# Patient Record
Sex: FEMALE | Race: WHITE
[De-identification: names, ages, dates, MRNs, and addresses within clinical notes are randomized per-mention and may not be internally consistent; named-entity substitution may affect disease eponyms.]

---

## 2017-03-28 LAB
ANION GAP SERPL CALC-SCNC: 6 MMOL/L (ref 3–11)
BASOPHILS # BLD: 0.01 K/UL (ref 0–0.2)
BASOPHILS NFR BLD: 0.2 %
BUN SERPL-MCNC: 21 MG/DL (ref 7–18)
BUN/CREAT SERPL: 22.9 (ref 10–20)
CALCIUM SERPL-MCNC: 8.9 MG/DL (ref 8.5–10.1)
CHLORIDE SERPL-SCNC: 108 MMOL/L (ref 98–107)
CO2 SERPL-SCNC: 28 MMOL/L (ref 21–32)
COMPLETE: YES
CREAT CL PREDICTED SERPL C-G-VRATE: 53.7 ML/MIN
CREAT SERPL-MCNC: 0.93 MG/DL (ref 0.6–1.2)
EOSINOPHIL NFR BLD AUTO: 309 K/UL (ref 130–400)
GLUCOSE SERPL-MCNC: 103 MG/DL (ref 70–99)
HCT VFR BLD CALC: 40.1 % (ref 37–47)
IG%: 0.2 %
IMM GRANULOCYTES NFR BLD AUTO: 21.5 %
LYMPHOCYTES # BLD: 1.23 K/UL (ref 1.2–3.4)
MCH RBC QN AUTO: 30.8 PG (ref 25–34)
MCHC RBC AUTO-ENTMCNC: 32.7 G/DL (ref 32–36)
MCV RBC AUTO: 94.4 FL (ref 80–100)
MONOCYTES NFR BLD: 8.2 %
NEUTROPHILS # BLD AUTO: 1 %
NEUTROPHILS NFR BLD AUTO: 68.9 %
PMV BLD AUTO: 9.9 FL (ref 7.4–10.4)
POTASSIUM SERPL-SCNC: 4.3 MMOL/L (ref 3.5–5.1)
RBC # BLD AUTO: 4.25 M/UL (ref 4.2–5.4)
SODIUM SERPL-SCNC: 142 MMOL/L (ref 136–145)
WBC # BLD AUTO: 5.72 K/UL (ref 4.8–10.8)

## 2017-03-28 NOTE — DIAGNOSTIC IMAGING REPORT
CHEST 2 VIEWS ROUTINE



CLINICAL HISTORY: Preoperative chest    



COMPARISON STUDY:  7/9/2015



FINDINGS: The cardiac and mediastinal contours are normal. There is no evidence

of focal pulmonary consolidation. There is no evidence of failure. No pleural

effusions are visualized.[ There is stable right middle lobe atelectasis. There

is a vague 15 mm left upper lung zone opacity. It is unclear whether this

represents a parenchymal nodule or summation.



IMPRESSION: Summation versus 15 mm left apical nodule. No evidence of lobar

consolidation. No evidence of failure.







Electronically signed by:  Tone Donato M.D.

3/28/2017 12:55 PM



Dictated Date/Time:  3/28/2017 12:52 PM

## 2017-03-28 NOTE — PAT MEDICATION INSTRUCTIONS
Service Date


Mar 28, 2017.





Current Home Medication List


Acetaminophen (Tylenol), 1,000 MG PO AMHS PRN for Pain


Albuterol Sulf (Albuterol), 8 MG PO BID


Ascorbic Acid (Vitamin C), 1,500 MG PO QAM


B-Complex Vitamins (Vitamin B Complex), 100 MG PO QAM


Beclomethasone Dip (Qvar), 1-2 PUFFS INH BID


Biotin (Biotin), 3,000 MCG PO BID


Budesonide (Pulmicort Inhaler *), 1-2 PUFF INH BID


Cholecalciferol (Vitamin D-1000), 1,000 UNITS PO HS


Clonazepam (Clonazepam Odt), 0.5 MG PO HS


Cyanocobalamin (Vitamin B12 500MCG), 500 MCG PO QPM


Escitalopram (Lexapro), 15 MG PO QPM


Metformin Hcl (Glucophage *), 500 MG PO QAM


Montelukast Sodium (Singulair), 10 MG PO HS


Multivitamin (Multivitamin), 1 TAB PO QAM


Nabumetone (Relafen), 500 MG PO BID


Pravastatin Sodium (Pravachol), 20 MG PO HS


Pyridoxine Hcl (Vitamin B6), 100 MG PO HS


Ropinirole (Requip), 0.5 MG PO HS


Salmeterol Xinafoate (Serevent Diskus), 1 PUFF INH BID


Vitamin E (Vitamin E 400 Iu), 400 INTER.UNIT PO QPM


[potassium magnesium], 3 TAB PO BID





Medication Instructions


For Your Scheduled Surgery 





- Check with surgeon for instructions: 


Nabumetone (Relafen), 500 MG PO BID





- Hold the following medications starting 3/28/17:


Vitamin E (Vitamin E 400 Iu), 400 INTER.UNIT PO QPM





- Hold the following medications 48 hours prior to surgery:


Metformin Hcl (Glucophage *), 500 MG PO QAM





- Hold the following medications the morning of surgery:


[potassium magnesium], 3 TAB PO BID


Multivitamin (Multivitamin), 1 TAB PO QAM


Biotin (Biotin), 3,000 MCG PO BID


Ascorbic Acid (Vitamin C), 1,500 MG PO QAM


B-Complex Vitamins (Vitamin B Complex), 100 MG PO QAM





- Take the following medications the morning of surgery with a sip of water:


Salmeterol Xinafoate (Serevent Diskus), 1 PUFF INH BID


Budesonide (Pulmicort Inhaler *), 1-2 PUFF INH BID


Beclomethasone Dip (Qvar), 1-2 PUFFS INH BID


Albuterol Sulf (Albuterol), 8 MG PO BID


Acetaminophen (Tylenol), 1,000 MG PO AMHS PRN for Pain





- Hold the following medications as scheduled the night before surgery:


Ropinirole (Requip), 0.5 MG PO HS





- Take the following medications as scheduled the night before surgery:


[potassium magnesium], 3 TAB PO BID


Salmeterol Xinafoate (Serevent Diskus), 1 PUFF INH BID


Pyridoxine Hcl (Vitamin B6), 100 MG PO HS


Pravastatin Sodium (Pravachol), 20 MG PO HS


Montelukast Sodium (Singulair), 10 MG PO HS


Escitalopram (Lexapro), 15 MG PO QPM


Cyanocobalamin (Vitamin B12 500MCG), 500 MCG PO QPM


Clonazepam (Clonazepam Odt), 0.5 MG PO HS


Cholecalciferol (Vitamin D-1000), 1,000 UNITS PO HS


Budesonide (Pulmicort Inhaler *), 1-2 PUFF INH BID


Biotin (Biotin), 3,000 MCG PO BID


Beclomethasone Dip (Qvar), 1-2 PUFFS INH BID


Albuterol Sulf (Albuterol), 8 MG PO BID


Acetaminophen (Tylenol), 1,000 MG PO AMHS PRN for Pain





If you have any questions please call us at [phone] (Jessie Sue PA-C)

 or [phone] or [phone]

## 2017-04-10 ENCOUNTER — HOSPITAL ENCOUNTER (OUTPATIENT)
Dept: HOSPITAL 45 - C.ACU | Age: 77
Discharge: HOME | End: 2017-04-10
Attending: OBSTETRICS & GYNECOLOGY
Payer: COMMERCIAL

## 2017-04-10 VITALS
SYSTOLIC BLOOD PRESSURE: 140 MMHG | DIASTOLIC BLOOD PRESSURE: 65 MMHG | TEMPERATURE: 97.52 F | OXYGEN SATURATION: 93 % | HEART RATE: 79 BPM

## 2017-04-10 VITALS
SYSTOLIC BLOOD PRESSURE: 133 MMHG | TEMPERATURE: 97.52 F | OXYGEN SATURATION: 93 % | HEART RATE: 82 BPM | DIASTOLIC BLOOD PRESSURE: 60 MMHG

## 2017-04-10 VITALS
BODY MASS INDEX: 27 KG/M2 | BODY MASS INDEX: 27 KG/M2 | HEIGHT: 65.98 IN | BODY MASS INDEX: 27 KG/M2 | WEIGHT: 167.99 LBS | WEIGHT: 167.99 LBS | HEIGHT: 65.98 IN

## 2017-04-10 VITALS
HEART RATE: 83 BPM | TEMPERATURE: 97.88 F | DIASTOLIC BLOOD PRESSURE: 64 MMHG | OXYGEN SATURATION: 94 % | SYSTOLIC BLOOD PRESSURE: 133 MMHG

## 2017-04-10 VITALS
SYSTOLIC BLOOD PRESSURE: 141 MMHG | TEMPERATURE: 97.88 F | OXYGEN SATURATION: 93 % | DIASTOLIC BLOOD PRESSURE: 66 MMHG | HEART RATE: 79 BPM

## 2017-04-10 VITALS
SYSTOLIC BLOOD PRESSURE: 132 MMHG | TEMPERATURE: 98.42 F | HEART RATE: 73 BPM | DIASTOLIC BLOOD PRESSURE: 66 MMHG | OXYGEN SATURATION: 96 %

## 2017-04-10 DIAGNOSIS — K21.9: ICD-10-CM

## 2017-04-10 DIAGNOSIS — G40.909: ICD-10-CM

## 2017-04-10 DIAGNOSIS — E55.9: ICD-10-CM

## 2017-04-10 DIAGNOSIS — Z81.1: ICD-10-CM

## 2017-04-10 DIAGNOSIS — E78.5: ICD-10-CM

## 2017-04-10 DIAGNOSIS — F32.9: ICD-10-CM

## 2017-04-10 DIAGNOSIS — J45.909: ICD-10-CM

## 2017-04-10 DIAGNOSIS — N83.299: Primary | ICD-10-CM

## 2017-04-10 DIAGNOSIS — E11.9: ICD-10-CM

## 2017-04-10 PROCEDURE — 58661 LAPAROSCOPY REMOVE ADNEXA: CPT

## 2017-04-10 NOTE — ANESTHESIOLOGY PROGRESS NOTE
Anesthesia Post Op Note


Date & Time


Apr 10, 2017 at 10:49





Vital Signs


Pain Intensity:  0





 Vital Signs Past 12 Hours








  Date Time  Temp Pulse Resp B/P Pulse Ox O2 Delivery O2 Flow Rate FiO2


 


4/10/17 10:25 36.4 82 16 133/60 93 Nasal Cannula 3 


 


4/10/17 09:55 36.4 79 16 140/65 93 Nasal Cannula 3 


 


4/10/17 09:45 36.2 76 18 120/61 95 Nasal Cannula 3 


 


4/10/17 09:35  79 20 133/64 98 Nasal Cannula 3 


 


4/10/17 09:25  83 20 128/66 98 Mask 5 


 


4/10/17 09:15  90 20 135/67 99 Mask 10 


 


4/10/17 09:11 36.0 101 18 143/73 99 Mask 10 


 


4/10/17 06:05 36.9 73 18 132/66 96 Room Air  











Notes


Mental Status:  alert / awake / arousable, participated in evaluation


Pt Amnestic to Procedure:  Yes


Nausea / Vomiting:  adequately controlled


Pain:  adequately controlled


Airway Patency, RR, SpO2:  stable & adequate


BP & HR:  stable & adequate


Hydration State:  stable & adequate


Anesthetic Complications:  no major complications apparent

## 2017-04-10 NOTE — OPERATIVE REPORT
DATE OF OPERATION:  04/10/2017

 

PREOPERATIVE DIAGNOSIS:  Bilateral complex ovarian cyst.

 

POSTOPERATIVE DIAGNOSIS:  Same.

 

PROCEDURE:  Laparoscopic bilateral salpingo-oophorectomy with da Bettina

assist.

 

SURGEON:  Carolyn Diallo MD

 

ANESTHESIA:  General per endotracheal tube.

 

ESTIMATED BLOOD LOSS:  10 mL.

 

FLUIDS:  1100.

 

URINE OUTPUT:  300 mL of bright yellow urine drained from the bladder at the

end of the procedure.

 

INDICATIONS:  Hayley is a 77-year-old PM female, who has

had bilateral complex ovarian cysts detected over a year ago via incidental

CT for diarrhea.  We have been watching these and unfortunately the right one

has continued to grow.  CA-125 has been normal.  Findings at the time of

surgery; bilateral complex ovarian masses noted.  The right measured 6 cm

with a fluid-filled cyst.  The left measured 3 cm.  Tubes were normal

bilaterally.  No other concerning findings in the pelvis.

 

COMPLICATIONS:  None.

 

DRAINS:  None.

 

DISPOSITION:  To recovery room in stable condition.

 

PROCEDURE:  The patient was taken to the operating room where she was

identified verbally and by bracelet.  The patient was placed in the dorsal

lithotomy position in Yellofin stirrups prior to going to sleep to make sure

that the patient's legs, back and knee were comfortable.  This was confirmed

and she was placed under general anesthesia.  Her arms were carefully tucked

and draped at her side.  Her chest was protected.  The  was placed

and she was prepped and draped in normal sterile fashion.  Timeout was held

identifying correct patient, procedure and positioning.  There was no need

for preoperative antibiotics.  Attention was then turned to the perineum

where a Leong catheter was placed.  A speculum was placed in the vagina and

the anterior lip of the cervix was grasped with a single tooth tenaculum.  A

Hummel uterine manipulator was placed into the cervix.  The speculum was

removed and gloves were then changed.

 

Attention was then turned to the abdomen where a supraumbilical incision was

made with a knife.  This was stretched with a snap.  Veress needle was placed

through this opening pressure of 2 mmHg. Abdomen was insufflated with 2.7

liters of carbon dioxide gas.  A 12 mm optical trocar was placed through this

and intra-abdominal placement was confirmed via the laparoscope.  Then two 8

mm trocar sites; one in the right and left lower quadrant were placed.  A 10

mm left upper quadrant trocar was placed.  The da Bettina surgical assist

device was hooked to the patient after she was placed into steep

Trendelenburg.  The  proceeded to the console.

 

First on the right and then on the left the IP was identified and found to be

free from the ureter; it was taken down with cautery and scissors and then

across the tube and the tubo-ovarian ligament until the specimens were

removed.  This was first performed on the right and then on the left. 

Hemostasis was noted to be excellent.

 

The surgeon scrubbed back and the da Bettina surgical assist device was

removed.  A 5 mm scope was placed into the left lower quadrant trocar.  A 10

mm EndoCatch bag was placed through the umbilical trocar site and first the

left ovary was placed and removed.  The right ovary was then placed in a

separate bag.  The cyst was popped with a knife, the fluid was sent for

cytology and then the specimen was able to be removed.  Re-evaluation of the

pelvis showed adequate hemostasis.  All trocars were removed.  The gas was

released from the abdomen.  A deep big figure-of-eight stitch of 0 Vicryl was

placed into the umbilical trocar site and all incisions were then closed with

4-0 Vicryl in a subcuticular fashion.  The incisions were treated with 0.5%

Marcaine and then Dermabond.  All sponge, lap and needle counts were correct

x2.  The patient tolerated the procedure well and was taken to the recovery

room in stable condition.

 

 

I attest to the content of the Intraoperative Record and any orders documented 
therein. Any exceptions are noted below.

 

 

 

SHIRA

## 2017-04-10 NOTE — DISCHARGE INSTRUCTIONS
Discharge Instructions


Date of Service


Apr 10, 2017.





Visit


Reason for Visit:  Complex Ovarian Cyst





Discharge


Discharge Diagnosis / Problem:  s/p removal of both tubes and ovaries 

laproscopically





Discharge Goals


Goal(s):  Specific goals





Activity Recommendations


Activity Limitations:  per Instructions/Follow-up section





Anesthesia


.





Post Anesthesia Instructions:





If you have had General Anesthesia or IV Sedation:





*  Do not drive today.


*  Resume driving when surgeon permits.


*  Do not make important decisions or sign legal documents today.


*  Call surgeon for:





   1.  Temperature elevations greater than 101 degrees F.


   2.  Uncontrollable pain.


   3.  Excessive bleeding.


   4.  Persistent nausea and vomiting.


   5.  Medication intolerance (nausea, vomiting or rash).





*  For nausea and vomiting use only clear liquids such as: tea, soda, bouillon 

until nausea subsides, then gradually increase diet as tolerated.





*  If you have any concerns or questions, call your surgeon's office.  If 

physician is unavailable and it is an emergency, call 911 or go to the nearest 

emergency room.





.





Instructions / Follow-Up


Instructions / Follow-Up





   ACTIVITY RECOMMENDATIONS:





*  Rest the first 2-3 days. You should be back to your normal activity levels 

by day 3.





*  No heavy lifting for 2 weeks.





*  No intercourse, tampons or douching for 1-2 weeks.





*  You may shower the next day.





*  Do not drive anytime that you are taking narcotic pain medicines.








   RETURN TO SCHOOL/WORK:





*  May return to school or work after 2-3 days.








   DIET:





Nausea may occur in the immediate post-operative period.  If so, take clear 

liquids such as tea,


bouillon, apple juice until all nausea has subsided, then resume usual diet.








  MEDICATIONS:





Resume previous medications unless instructed otherwise by your surgeon.





Ibuprofen 200mg 2-3 tablets every 4-6 hours as needed


      -- OR --


Aleve 2 tablets every 8-12 hours as needed for post-operative discomfort





Medications are over the counter. Tylenol may be used if above medications are 

contraindicated


or not preferred. Medication should be taken with food or milk. Do not take on 

an empty stomach.








  SPECIAL CARE INSTRUCTIONS:





*  Check temperature twice daily for one week. report any elevation over 101 

degrees.





*  You may experience some vagina spotting and/or bleeding. This is normal for 1

-2 weeks and


   should not be heavier than a normal period. If it is unusual in amount, call 

your physician.





*  Post-operative discomfort may consist of a sore throat, a "bloated" feeling 

and pain in the 


   shoulders. these are normal symptoms, which usually only last for 2-3 days.





*  Remove band-aids tomorrow and shower. There is no need to replace band-aids 

unless there


   is drainage or discomfort.








 FOLLOW UP VISIT:





Call your doctor's office for a post-operative  2 week visit if not already 

scheduled.





Diet Recommendations


Recommended Home Diet:  no limitations, resume previous diet





Procedures


Procedures Performed:  


Bilateral Laparoscopic Salpingo-Oophorectomy





with use of DaVinci





Pending Studies


Studies pending at discharge:  no





Medical Emergencies








.


Who to Call and When:





Medical Emergencies:  If at any time you feel your situation is an emergency, 

please call 911 immediately.





.





Non-Emergent Contact


Non-Emergency issues call your:  Gynecologist





.


.





"Provider Documentation" section prepared by Carolyn Diallo.





[place] Drug Monitoring Program


Search Results:  no issues identified

## 2017-04-10 NOTE — [facility] POST OPERATIVE BRIEF NOTE
Immediate Operative Summary


Operative Date


Apr 10, 2017.





Pre-Operative Diagnosis





Complex Ovarian Cysts





Post-Operative Diagnosis





Complex Ovarian Cysts





Procedure(s) Performed





Bilateral Laparoscopic Salpingo-Oophorectomy





with use of Aldair





Surgeon


Dr. Carolyn Diallo





Assistant Surgeon(s)


none





Estimated Blood Loss


10mL





Findings


bilateral enlarged ovaries, right with a cystic mass about 5cm, left with 

cystic mass about 3cm.  nl liver edge, nl uterus and tubes, no other concerning 

signs in the pelvis.





Fluids (cc crystalloids)


1100cc





Specimens





right tube, and ovary


left tube and ovary


Cytology:


1.  pelvic washings.





2. Right Ovarian Cyst Fluid





Drains


none





Anesthesia


gett





Complication(s)


None





Disposition


Recovery Room / PACU

## 2017-09-21 ENCOUNTER — HOSPITAL ENCOUNTER (OUTPATIENT)
Dept: HOSPITAL 45 - C.ULTR | Age: 77
Discharge: HOME | End: 2017-09-21
Attending: PHYSICIAN ASSISTANT
Payer: COMMERCIAL

## 2017-09-21 DIAGNOSIS — R10.11: Primary | ICD-10-CM

## 2017-09-21 NOTE — DIAGNOSTIC IMAGING REPORT
ULTRASOUND RIGHT UPPER QUADRANT ABDOMEN



CLINICAL HISTORY: Right upper quadrant and epigastric abdominal pain.



COMPARISON STUDY: Abdominal CT dated 7/4/2015.



TECHNIQUE: Real-time, grayscale, and color flow sonography of the right upper

quadrant of the abdomen was performed. Images are reviewed in the transverse and

longitudinal planes.



FINDINGS:



Liver: The liver is normal in size and echotexture. There is no intrahepatic

biliary ductal dilatation. The main portal vein is patent.



Gallbladder: The gallbladder is normal in appearance. No gallstones are

identified. There is no gallbladder wall thickening or pericholecystic fluid. A

sonographic Finley's sign is reportedly absent. The common bile duct measures up

to 0.4 cm in diameter.



Pancreas: Visualized portions of the pancreatic head and body are normal in

appearance. The splenic vein is patent.



Right kidney: Survey images of the right kidney demonstrate normal size and

echotexture. There is no hydronephrosis. A 1.8 cm cyst is incidentally noted.



Ascites: None.





IMPRESSION: Unremarkable sonographic assessment of the right upper quadrant. No

gallstones are identified.







Electronically signed by:  Michael Benavidez M.D.

9/21/2017 8:30 AM



Dictated Date/Time:  9/21/2017 8:29 AM

## 2017-11-20 ENCOUNTER — HOSPITAL ENCOUNTER (OUTPATIENT)
Dept: HOSPITAL 45 - C.MAMM | Age: 77
Discharge: HOME | End: 2017-11-20
Attending: OBSTETRICS & GYNECOLOGY
Payer: COMMERCIAL

## 2017-11-20 DIAGNOSIS — R92.0: Primary | ICD-10-CM

## 2017-11-20 NOTE — MAMMOGRAPHY REPORT
BILATERAL DIGITAL DIAGNOSTIC MAMMOGRAM TOMOSYNTHESIS WITH CAD: 11/20/2017

CLINICAL HISTORY: 77-year-old woman presents for close follow-up of coarse heterogeneous microcalcifi
cations in the left lateral posterior breast and also due for bilateral screening mammography.  





TECHNIQUE: Bilateral CC and MLO 2-D and tomosynthesis images, spot magnification left CC and ML views
 were obtained.  Current study was also evaluated with a Computer Aided Detection (CAD) system.  



COMPARISON: Comparison is made to exams dated:  11/14/2016 mammogram, 5/11/2016 mammogram, 2/10/2016 
mammogram, 11/9/2015 mammogram, 10/28/2015 mammogram - [facility], and 3/11/2009.  
 



BREAST COMPOSITION:  The tissue of both breasts is heterogeneously dense, which may obscure small mas
ses.  



FINDINGS:  There is an 8.2 mm cluster of coarse heterogeneous calcifications in the approximate 3:00 
to 4:00 posterior left breast, that is unchanged based on spot magnification views dating back to at 
least 11/09/2015.  With 2 years of mammographic stability this suggests benignity and this most likel
y represents a degenerating fibroadenoma.  Given that this was not seen on more remote mammograms inc
luding the 2008 and 2009 mammograms remains indeterminate.  Another 12 month follow-up left diagnosti
c mammogram including spot magnification views is recommended to ensure longer stability.



No other new suspicious mass, area of distortion, developing asymmetry or other cluster of calcificat
ions is seen bilaterally.



IMPRESSION:  ACR-BI-RADS CATEGORY 3: PROBABLY BENIGN

Stable bilateral mammograms including an 8.2 mm cluster of coarse heterogeneous microcalcifications i
n the lateral posterior left breast.  Another 12 month follow-up left diagnostic mammogram including 
spot magnification views is recommended to ensure longer stability of the calcifications.  Annual maricruz
ateral mammography will also be due at that time.



These results and recommendations were discussed with the patient at the time of the exam.





Approximately 10% of breast cancers are not detected with mammography. A negative mammographic report
 should not delay biopsy if a clinically suggestive mass is present.



Areli Steinberg M.D.          

ay/:11/20/2017 13:40:20  



Imaging Technologist: Treasure GALVAN(R)(M), [facility]

letter sent: Follow Up Recommended 3  

BI-RADS Code: ACR-BI-RADS Category 3: Probably Benign

## 2018-01-02 ENCOUNTER — HOSPITAL ENCOUNTER (OUTPATIENT)
Dept: HOSPITAL 45 - C.LAB1850 | Age: 78
Discharge: HOME | End: 2018-01-02
Attending: INTERNAL MEDICINE
Payer: COMMERCIAL

## 2018-01-02 DIAGNOSIS — R53.81: ICD-10-CM

## 2018-01-02 DIAGNOSIS — J45.909: Primary | ICD-10-CM

## 2018-01-02 DIAGNOSIS — R10.9: ICD-10-CM

## 2018-01-02 LAB
ALBUMIN SERPL-MCNC: 3.8 GM/DL (ref 3.4–5)
ALP SERPL-CCNC: 89 U/L (ref 45–117)
ALT SERPL-CCNC: 30 U/L (ref 12–78)
AST SERPL-CCNC: 21 U/L (ref 15–37)
BASOPHILS # BLD: 0.02 K/UL (ref 0–0.2)
BASOPHILS NFR BLD: 0.3 %
BUN SERPL-MCNC: 33 MG/DL (ref 7–18)
CALCIUM SERPL-MCNC: 9.3 MG/DL (ref 8.5–10.1)
CO2 SERPL-SCNC: 25 MMOL/L (ref 21–32)
CREAT SERPL-MCNC: 0.92 MG/DL (ref 0.6–1.2)
EOS ABS #: 0.07 K/UL (ref 0–0.5)
EOSINOPHIL NFR BLD AUTO: 335 K/UL (ref 130–400)
GLUCOSE SERPL-MCNC: 109 MG/DL (ref 70–99)
HCT VFR BLD CALC: 46.1 % (ref 37–47)
HGB BLD-MCNC: 15 G/DL (ref 12–16)
IG#: 0.02 K/UL (ref 0–0.02)
IMM GRANULOCYTES NFR BLD AUTO: 16.9 %
LYMPHOCYTES # BLD: 1.12 K/UL (ref 1.2–3.4)
MCH RBC QN AUTO: 31.1 PG (ref 25–34)
MCHC RBC AUTO-ENTMCNC: 32.5 G/DL (ref 32–36)
MCV RBC AUTO: 95.6 FL (ref 80–100)
MONO ABS #: 0.65 K/UL (ref 0.11–0.59)
MONOCYTES NFR BLD: 9.8 %
NEUT ABS #: 4.76 K/UL (ref 1.4–6.5)
NEUTROPHILS # BLD AUTO: 1.1 %
NEUTROPHILS NFR BLD AUTO: 71.6 %
PMV BLD AUTO: 10.7 FL (ref 7.4–10.4)
POTASSIUM SERPL-SCNC: 4.1 MMOL/L (ref 3.5–5.1)
PROT SERPL-MCNC: 8 GM/DL (ref 6.4–8.2)
RED CELL DISTRIBUTION WIDTH CV: 13.6 % (ref 11.5–14.5)
RED CELL DISTRIBUTION WIDTH SD: 47.7 FL (ref 36.4–46.3)
SODIUM SERPL-SCNC: 140 MMOL/L (ref 136–145)
WBC # BLD AUTO: 6.64 K/UL (ref 4.8–10.8)

## 2018-01-12 ENCOUNTER — HOSPITAL ENCOUNTER (OUTPATIENT)
Dept: HOSPITAL 45 - C.CTS | Age: 78
Discharge: HOME | End: 2018-01-12
Attending: PHYSICIAN ASSISTANT
Payer: COMMERCIAL

## 2018-01-12 DIAGNOSIS — R10.9: Primary | ICD-10-CM

## 2018-01-12 NOTE — DIAGNOSTIC IMAGING REPORT
ABD/PELVIS IV AND ORAL CONT



CT DOSE: 840.21 mGycm



HISTORY: Right flank pain  R10.9 Acute right flank painr/o kidney stone and

appendicitisCTS



TECHNIQUE: Multiaxial CT images of the abdomen and pelvis were performed

following the use of intravenous and oral contrast.  A dose lowering technique

was utilized adhering to the principles of ALARA.





COMPARISON STUDY: 7/4/2015



FINDINGS: Mild chronic right basilar atelectatic change. This is unaltered from

the prior study.



Liver enhances uniformly. Several calcified splenic granulomas unchanged.

Moderate fatty replacement of the pancreas.



Kidneys are considered negative for hydronephrosis. Bilateral renal cysts are

present and are stable. Interval postoperative changes of the lumbar spine

consistent with laminectomy and fusion. Bowel pattern within the abdomen and

pelvis is considered nonobstructive.



No evidence for an obstructing urinary tract calculus. Visualized components of

the appendix are unremarkable.



IMPRESSION: 

Chronic and postoperative change. No acute process the abdomen or pelvis. 









The above report was generated using voice recognition software.  It may contain

grammatical, syntax or spelling errors.







Electronically signed by:  Dhruv Curran M.D.

1/12/2018 2:51 PM



Dictated Date/Time:  1/12/2018 2:45 PM

## 2018-02-12 ENCOUNTER — HOSPITAL ENCOUNTER (OUTPATIENT)
Dept: HOSPITAL 45 - C.GI | Age: 78
Discharge: HOME | End: 2018-02-12
Attending: INTERNAL MEDICINE
Payer: COMMERCIAL

## 2018-02-12 VITALS
WEIGHT: 170.35 LBS | WEIGHT: 170.35 LBS | HEIGHT: 67.01 IN | BODY MASS INDEX: 26.74 KG/M2 | BODY MASS INDEX: 26.74 KG/M2 | BODY MASS INDEX: 26.74 KG/M2 | HEIGHT: 67.01 IN

## 2018-02-12 VITALS — HEART RATE: 60 BPM | OXYGEN SATURATION: 96 % | DIASTOLIC BLOOD PRESSURE: 85 MMHG | SYSTOLIC BLOOD PRESSURE: 155 MMHG

## 2018-02-12 VITALS — TEMPERATURE: 97.7 F

## 2018-02-12 DIAGNOSIS — J45.909: ICD-10-CM

## 2018-02-12 DIAGNOSIS — G40.909: ICD-10-CM

## 2018-02-12 DIAGNOSIS — R93.3: ICD-10-CM

## 2018-02-12 DIAGNOSIS — Z87.891: ICD-10-CM

## 2018-02-12 DIAGNOSIS — K64.8: ICD-10-CM

## 2018-02-12 DIAGNOSIS — F32.9: ICD-10-CM

## 2018-02-12 DIAGNOSIS — R10.31: Primary | ICD-10-CM

## 2018-02-12 DIAGNOSIS — Z88.8: ICD-10-CM

## 2018-02-12 DIAGNOSIS — Z88.0: ICD-10-CM

## 2018-02-12 DIAGNOSIS — M19.90: ICD-10-CM

## 2018-02-12 DIAGNOSIS — K57.30: ICD-10-CM

## 2018-02-12 DIAGNOSIS — Z88.1: ICD-10-CM

## 2018-02-12 DIAGNOSIS — E78.00: ICD-10-CM

## 2018-02-12 DIAGNOSIS — E11.9: ICD-10-CM

## 2018-02-12 DIAGNOSIS — Z79.84: ICD-10-CM

## 2018-02-12 DIAGNOSIS — Z79.899: ICD-10-CM

## 2018-02-12 DIAGNOSIS — Z88.2: ICD-10-CM

## 2018-02-12 NOTE — ANESTHESIOLOGY PROGRESS NOTE
Anesthesia Post Op Note


Date & Time


Feb 12, 2018 at 12:12





Vital Signs


Pain Intensity:  0





Vital Signs Past 12 Hours








  Date Time  Temp Pulse Resp B/P (MAP) Pulse Ox O2 Delivery O2 Flow Rate FiO2


 


2/12/18 12:07  60 18 155/85 (108) 96 Room Air  


 


2/12/18 11:52  68 16 138/72 (94) 95 Room Air  


 


2/12/18 11:37  59 16 107/55 (72) 96 Room Air  


 


2/12/18 11:10 36.5 70 20 134/72 (92) 94 Room Air  











Notes


Mental Status:  alert / awake / arousable, participated in evaluation


Pt Amnestic to Procedure:  Yes


Nausea / Vomiting:  adequately controlled


Pain:  adequately controlled


Airway Patency, RR, SpO2:  stable & adequate


BP & HR:  stable & adequate


Hydration State:  stable & adequate


Anesthetic Complications:  no major complications apparent

## 2018-02-12 NOTE — DISCHARGE INSTRUCTIONS
Endoscopy Patient Instructions


Date / Procedure(s) Performed


Feb 12, 2018.


Colonoscopy





Allergy Information


Coded Allergies:  


     Alcohol (Verified  Allergy, Unknown, alcoholic anonymous, 2/12/18)


     Methylprednisolone (Verified  Allergy, Unknown, UNSURE?, 2/12/18)


     Levofloxacin (Unverified  Adverse Reaction, Intermediate, Insomnia and 

Anxiety, 2/12/18)


 Per Pulmonary records


     Moxifloxacin (Unverified  Adverse Reaction, Intermediate, Insomnia and 

"shaky", 2/12/18)


 Per Pulmonology records


     Sulfa Antibiotics (Verified  Adverse Reaction, Intermediate, severe 

vomiting, 2/12/18)


     Penicillins (Verified  Adverse Reaction, Unknown, HEART PALPIATIONS, 2/12/ 18)





Discharge Date / Findings


Feb 12, 2018.


Diverticulosis 


Internal hemorrhoids





Medication Instructions


OK to resume all medications today as prescribed





Reported Home Medications








 Medications  Dose


 Route/Sig


 Max Daily Dose Days Date Category


 


 Glucophage


  (Metformin Hcl)


 500 Mg Tab  500 Mg


 PO QAM


    1/30/18 Reported


 


 Mycogen ||


  (Nystatin/Triamcinolone


 Acetonide)  Cr  1 Dose


 EXT BID PRN


    1/30/18 Reported


 


 Prilosec


  (Omeprazole) 20


 Mg Capcr  20 Mg


 PO QPM


    1/30/18 Reported


 


 Remeron Soltab


  (Mirtazapine) 15


 Mg Soltab  15 Mg


 PO QPM


    1/30/18 Reported


 


 Requip


  (Ropinirole HCl)


 0.25 Mg Tab  2 Tab


 PO QPM


    1/30/18 Reported


 


 Pravachol


  (Pravastatin


 Sodium) 20 Mg Tab  20 Mg


 PO QPM


    1/30/18 Reported


 


 Lexapro


  (Escitalopram


 Oxalate) 10 Mg Tab  1.5 Tab


 PO QPM


    1/30/18 Reported


 


 Relafen


  (Nabumetone) 500


 Mg Tab  500 Mg


 PO BID


    1/30/18 Reported


 


 Albuterol Sulfate


 Er (Albuterol


 Sulfate) 4 Mg Tab  1 Tab


 PO TID


    1/30/18 Reported


 


 Singulair


  (Montelukast


 Sodium) 10 Mg Tab  10 Mg


 PO HS


    1/30/18 Reported


 


 Atrovent Hfa


  (Ipratropium


 Bromide) 200


 Puffs/3400 Mcg


 Aers  1 Puffs


 INH BID


    1/30/18 Reported


 


 Qvar


  (Beclomethasone


 Dip) 120


 Puffs/4800 Mcg


 Aers  1 Puff


 INH BID


    1/30/18 Reported


 


 Serevent Diskus


  (Salmeterol


 Xinafoate) 50 Mcg


 Aerp  1 Dose


 INH BID


    1/30/18 Reported


 


 Preservision


 Areds 2 (Multiple


 Vitamins W/


 Minerals) 1 Cap


 Cap  1 Cap


 PO BID


    1/30/18 Reported


 


 Multivitamin


  (Multivitamins)


 Tab  1 Tab


 PO QAM


    1/30/18 Reported


 


 Stool Softener


  (Sennosides-Docusate


 Sodium) 1 Tab Tab  1 Tab


 PO HS


    1/30/18 Reported


 


 Biotin 1 Mg Cap  1 Cap


 PO BID


    1/30/18 Reported


 


 Vitamin D


  (Cholecalciferol)


 1,000 Unit Tab  1 Tab


 PO QAM


    1/30/18 Reported


 


 Vitamin E 400 Iu


  (Vitamin E) 400


 Unit Cap  400 Inter.unit


 PO QPM


    1/30/18 Reported


 


 [Vitamin C]    1,500 Mg


 PO QAM


    1/30/18 Reported


 


 [Potassium


 W/Magn]  3 Tab


 PO BID


    1/30/18 Reported


 


 Vitamin B Complex


  (B-Complex


 Vitamins) 1 Tab


 Tab  1 Tab


 PO QAM


    1/30/18 Reported


 


 Vitamin B6 100 Mg


  (Pyridoxine Hcl)


 100 Mg Tab  100 Mg


 PO QAM


    1/30/18 Reported


 


 Vitamin B12


 500MCG


  (Cyanocobalamin)


 500 Mcg Tab  500 Mcg


 PO QAM


    1/30/18 Reported











Provider Instructions





Activity Restrictions





-  No exercising or heavy lifting for 24 hours. 


-  Do not drink alcohol the day of the procedure.


-  Do not drive a car or operate machinery until the day after the procedure.


-  Do not make any important decisions or sign important papers in 24 hours 

after the procedure.





Following Day:





-  Return to full activity which may include returning to work/school.





Diet





Start your diet with liquids and light foods (jello, soup, juice, toast).  Then 

eat your usual diet if not nauseated.





Treatment For Common After Affects





For mild abdominal pain, bloating, or excessive gas:





-  Rest


-  Eat lightly


-  Lie on right side





Follow-Up Information


Follow-up with Dr. Arango as scheduled





Anesthesia Information





What You Should Know





You have had a procedure that required some medicine to reduce anxiety and 

discomfort. This treatment is called moderate sedation.  


After receiving the treatment, you may be sleepy, but you will be able to 

breathe on your own.  The effects of the treatment may last for several hours.








Follow these instructions along with Activity/Diet recommendations noted above:





*  Do NOT do anything where dizziness or clumsiness would be dangerous.





*  Rest quietly at home today, then you can be up and about tomorrow.





*  Have a responsible person stay with you the rest of today.





*  You may have had an I.V. today.  If so, you may take the dressing off later 

today.





Recommendations


 


Call your doctor if:





*  Trouble breathing 





*  Continuous vomiting for more than 24 hours








*  Temperature above 101 degrees





*  Severe abdominal pain or bloating





*  Pain not relieved by pain medicine ordered





*  There is increased drainage or redness from any incision





*  A large amount of rectal bleeding greater than 2-3 tablespoons. 


   (If you had a polyp/s removed or have hemorrhoids, a small amount of blood -


    from the rectum is to be expected.)





*  You have any unanswered questions or concerns.








IN THE EVENT OF A SERIOUS EMERGENCY, GO TO THE NEAREST EMERGENCY ROOM








       Your discharge instructions were prepared by provider Kd Rodriguez.





 Patient Instructions Signature Page














Hayley Owens 











Patient (or Guardian) Signature/Date:____________________________________ I 

have read and understand the instructions given to me by my caregivers.








Caregiver/RN/Doctor Signature/Date:____________________________________











The above-named patient and/or guardian has received patient instructions on 

this date.





























+  Original Patient Signature Page (only) stays with chart.  Please make copy 

for patient.

## 2018-02-12 NOTE — GI REPORT
Procedure Date: 2/12/2018 11:20 AM

Procedure:            Colonoscopy

Indications:          Abnormal CT of the GI tract

Medicines:            Monitored Anesthesia Care

Complications:        No immediate complications.

Estimated Blood Loss: Estimated blood loss: none.

Procedure:            Pre-Anesthesia Assessment:

                      - Prior to the procedure, a History and Physical was 

                      performed, and patient medications and allergies were 

                      reviewed. The patient's tolerance of previous 

                      anesthesia was also reviewed. The risks and benefits of 

                      the procedure and the sedation options and risks were 

                      discussed with the patient. All questions were 

                      answered, and informed consent was obtained. Prior 

                      Anticoagulants: The patient has taken no previous 

                      anticoagulant or antiplatelet agents. ASA Grade 

                      Assessment: III - A patient with severe systemic 

                      disease. After reviewing the risks and benefits, the 

                      patient was deemed in satisfactory condition to undergo 

                      the procedure.

                      After I obtained informed consent, the scope was passed 

                      under direct vision. Throughout the procedure, the 

                      patient's blood pressure, pulse, and oxygen saturations 

                      were monitored continuously. The scope was introduced 

                      through the anus and advanced to the terminal ileum. 

                      The colonoscopy was performed without difficulty. The 

                      patient tolerated the procedure well. The quality of 

                      the bowel preparation was good. The terminal ileum, 

                      ileocecal valve, appendiceal orifice, and rectum were 

                      photographed.

Findings:

     The perianal and digital rectal examinations were normal.

     Multiple small-mouthed diverticula were found in the sigmoid colon.

     Non-bleeding internal hemorrhoids were found during retroflexion. The 

     hemorrhoids were small.

Impression:           - Diverticulosis in the sigmoid colon.

                      - Non-bleeding internal hemorrhoids.

                      - No specimens collected.

Recommendation:       - Resume previous diet.

                      - Continue present medications.

                      - No repeat colonoscopy due to age and the absence of 

                      advanced adenomas.

                      - Return to primary care physician as previously 

                      scheduled.

Kd Rodriguez, 

2/12/2018 11:41:48 AM

This report has been signed electronically.

Note Initiated On: 2/12/2018 11:20 AM

     I attest to the content of the Intraoperative Record and orders 

     documented therein, exceptions below

## 2018-02-12 NOTE — ENDO HISTORY AND PHYSICAL
History & Physical


Date of Service:


Feb 12, 2018.


Chief Complaint:


right sided abdominal pain


Referring Physician:


Dr. Arango


History of Present Illness


76 yo CF who presents for colonoscopy secondary to RLQ abdominal pain.





Past Medical History


Diabetes, Arthritis, Asthma, Seizure Disorder, High Cholesterol, Depression





Past Surgical History


Hx Cardiac Surgery:  No


Hx Internal Defibrillator:  No


Hx Pacemaker:  No


Hx Abdominal Surgery:  Yes (LAP BILAT SALPINGO/OOPHERECTOMY)


Hx of Implantable Prosthesis:  No


Hx Post-Op Nausea and Vomiting:  No


Hx Cancer Surgery:  No


Hx Thoracic Surgery:  No


Hx Orthopedic:  Yes (RT/LEFT SHOULDER ARTHR, R/HAND & WRIST, L/HAND & ARM, RT/

LEFT TKA)


Hx Urinary Tract Surgery:  No





Family History


None





Social History


Smoking Status:  Former Smoker


Hx Substance Use:  No ( )


Hx Alcohol Use:  Yes (ALCOHOL FREE FOR 31 YEARS)





Allergies


Coded Allergies:  


     Alcohol (Verified  Allergy, Unknown, alcoholic anonymous, 2/12/18)


     Methylprednisolone (Verified  Allergy, Unknown, UNSURE?, 2/12/18)


     Levofloxacin (Unverified  Adverse Reaction, Intermediate, Insomnia and 

Anxiety, 2/12/18)


 Per Pulmonary records


     Moxifloxacin (Unverified  Adverse Reaction, Intermediate, Insomnia and 

"shaky", 2/12/18)


 Per Pulmonology records


     Sulfa Antibiotics (Verified  Adverse Reaction, Intermediate, severe 

vomiting, 2/12/18)


     Penicillins (Verified  Adverse Reaction, Unknown, HEART PALPIATIONS, 2/12/ 18)





Current Medications





Reported Home Medications








 Medications  Dose


 Route/Sig


 Max Daily Dose Days Date Category


 


 Glucophage


  (Metformin Hcl)


 500 Mg Tab  500 Mg


 PO QAM


    1/30/18 Reported


 


 Mycogen ||


  (Nystatin/Triamcinolone


 Acetonide)  Cr  1 Dose


 EXT BID PRN


    1/30/18 Reported


 


 Prilosec


  (Omeprazole) 20


 Mg Capcr  20 Mg


 PO QPM


    1/30/18 Reported


 


 Remeron Soltab


  (Mirtazapine) 15


 Mg Soltab  15 Mg


 PO QPM


    1/30/18 Reported


 


 Requip


  (Ropinirole HCl)


 0.25 Mg Tab  2 Tab


 PO QPM


    1/30/18 Reported


 


 Pravachol


  (Pravastatin


 Sodium) 20 Mg Tab  20 Mg


 PO QPM


    1/30/18 Reported


 


 Lexapro


  (Escitalopram


 Oxalate) 10 Mg Tab  1.5 Tab


 PO QPM


    1/30/18 Reported


 


 Relafen


  (Nabumetone) 500


 Mg Tab  500 Mg


 PO BID


    1/30/18 Reported


 


 Albuterol Sulfate


 Er (Albuterol


 Sulfate) 4 Mg Tab  1 Tab


 PO TID


    1/30/18 Reported


 


 Singulair


  (Montelukast


 Sodium) 10 Mg Tab  10 Mg


 PO HS


    1/30/18 Reported


 


 Atrovent Hfa


  (Ipratropium


 Bromide) 200


 Puffs/3400 Mcg


 Aers  1 Puffs


 INH BID


    1/30/18 Reported


 


 Qvar


  (Beclomethasone


 Dip) 120


 Puffs/4800 Mcg


 Aers  1 Puff


 INH BID


    1/30/18 Reported


 


 Serevent Diskus


  (Salmeterol


 Xinafoate) 50 Mcg


 Aerp  1 Dose


 INH BID


    1/30/18 Reported


 


 Preservision


 Areds 2 (Multiple


 Vitamins W/


 Minerals) 1 Cap


 Cap  1 Cap


 PO BID


    1/30/18 Reported


 


 Multivitamin


  (Multivitamins)


 Tab  1 Tab


 PO QAM


    1/30/18 Reported


 


 Stool Softener


  (Sennosides-Docusate


 Sodium) 1 Tab Tab  1 Tab


 PO HS


    1/30/18 Reported


 


 Biotin 1 Mg Cap  1 Cap


 PO BID


    1/30/18 Reported


 


 Vitamin D


  (Cholecalciferol)


 1,000 Unit Tab  1 Tab


 PO QAM


    1/30/18 Reported


 


 Vitamin E 400 Iu


  (Vitamin E) 400


 Unit Cap  400 Inter.unit


 PO QPM


    1/30/18 Reported


 


 [Vitamin C]    1,500 Mg


 PO QAM


    1/30/18 Reported


 


 [Potassium


 W/Magn]  3 Tab


 PO BID


    1/30/18 Reported


 


 Vitamin B Complex


  (B-Complex


 Vitamins) 1 Tab


 Tab  1 Tab


 PO QAM


    1/30/18 Reported


 


 Vitamin B6 100 Mg


  (Pyridoxine Hcl)


 100 Mg Tab  100 Mg


 PO QAM


    1/30/18 Reported


 


 Vitamin B12


 500MCG


  (Cyanocobalamin)


 500 Mcg Tab  500 Mcg


 PO QAM


    1/30/18 Reported











Vital Signs


Weight (Kilograms):  77.27


Height (Feet):  5


Height (Inches):  7





Physical Exam


General Appearance:  WD/WN, no apparent distress


Respiratory/Chest:  


   Auscultation:  breath sounds normal


Cardiovascular:  


   Heart Auscultation:  RRR


Abdomen:  


   Bowel Sounds:  normal


   Inspection & Palpation:  soft, non-distended, no tenderness, guarding & 

rebound





Assessment and Plan


Assessment:


76 yo CF who presents for colonoscopy secondary to RLQ abdominal pain.








Plan:


Proceed with colonoscopy.

## 2018-04-30 ENCOUNTER — HOSPITAL ENCOUNTER (OUTPATIENT)
Dept: HOSPITAL 45 - C.LAB1850 | Age: 78
Discharge: HOME | End: 2018-04-30
Attending: INTERNAL MEDICINE
Payer: COMMERCIAL

## 2018-04-30 DIAGNOSIS — M47.816: ICD-10-CM

## 2018-04-30 DIAGNOSIS — H35.30: ICD-10-CM

## 2018-04-30 DIAGNOSIS — J45.909: Primary | ICD-10-CM

## 2018-04-30 DIAGNOSIS — R53.81: ICD-10-CM

## 2018-04-30 DIAGNOSIS — E11.9: ICD-10-CM

## 2018-04-30 DIAGNOSIS — E78.5: ICD-10-CM

## 2018-04-30 LAB
ALBUMIN SERPL-MCNC: 3.8 GM/DL (ref 3.4–5)
ALP SERPL-CCNC: 84 U/L (ref 45–117)
ALT SERPL-CCNC: 29 U/L (ref 12–78)
AST SERPL-CCNC: 23 U/L (ref 15–37)
BASOPHILS # BLD: 0.01 K/UL (ref 0–0.2)
BASOPHILS NFR BLD: 0.1 %
BUN SERPL-MCNC: 22 MG/DL (ref 7–18)
CALCIUM SERPL-MCNC: 9 MG/DL (ref 8.5–10.1)
CO2 SERPL-SCNC: 26 MMOL/L (ref 21–32)
CREAT SERPL-MCNC: 0.95 MG/DL (ref 0.6–1.2)
EOS ABS #: 0.05 K/UL (ref 0–0.5)
EOSINOPHIL NFR BLD AUTO: 311 K/UL (ref 130–400)
GLUCOSE SERPL-MCNC: 118 MG/DL (ref 70–99)
HCT VFR BLD CALC: 43 % (ref 37–47)
HGB BLD-MCNC: 14.3 G/DL (ref 12–16)
IG#: 0.01 K/UL (ref 0–0.02)
IMM GRANULOCYTES NFR BLD AUTO: 13 %
KETONES UR QL STRIP: 108 MG/DL
LYMPHOCYTES # BLD: 0.99 K/UL (ref 1.2–3.4)
MCH RBC QN AUTO: 31.9 PG (ref 25–34)
MCHC RBC AUTO-ENTMCNC: 33.3 G/DL (ref 32–36)
MCV RBC AUTO: 96 FL (ref 80–100)
MONO ABS #: 0.57 K/UL (ref 0.11–0.59)
MONOCYTES NFR BLD: 7.5 %
NEUT ABS #: 6.01 K/UL (ref 1.4–6.5)
NEUTROPHILS # BLD AUTO: 0.7 %
NEUTROPHILS NFR BLD AUTO: 78.6 %
PH UR: 209 MG/DL (ref 0–200)
PMV BLD AUTO: 10.3 FL (ref 7.4–10.4)
POTASSIUM SERPL-SCNC: 4.3 MMOL/L (ref 3.5–5.1)
PROT SERPL-MCNC: 7.6 GM/DL (ref 6.4–8.2)
RED CELL DISTRIBUTION WIDTH CV: 13.9 % (ref 11.5–14.5)
RED CELL DISTRIBUTION WIDTH SD: 49.1 FL (ref 36.4–46.3)
SODIUM SERPL-SCNC: 141 MMOL/L (ref 136–145)
WBC # BLD AUTO: 7.64 K/UL (ref 4.8–10.8)

## 2018-05-01 LAB — HBA1C MFR BLD: 6.2 % (ref 4.5–5.6)

## 2018-07-25 ENCOUNTER — HOSPITAL ENCOUNTER (EMERGENCY)
Dept: HOSPITAL 45 - C.EDB | Age: 78
Discharge: HOME | End: 2018-07-25
Payer: COMMERCIAL

## 2018-07-25 VITALS
BODY MASS INDEX: 27.64 KG/M2 | HEIGHT: 65.98 IN | WEIGHT: 171.96 LBS | WEIGHT: 171.96 LBS | BODY MASS INDEX: 27.64 KG/M2 | HEIGHT: 65.98 IN

## 2018-07-25 VITALS — SYSTOLIC BLOOD PRESSURE: 165 MMHG | OXYGEN SATURATION: 96 % | DIASTOLIC BLOOD PRESSURE: 82 MMHG | HEART RATE: 83 BPM

## 2018-07-25 VITALS — TEMPERATURE: 97.52 F

## 2018-07-25 DIAGNOSIS — Z88.0: ICD-10-CM

## 2018-07-25 DIAGNOSIS — Z79.899: ICD-10-CM

## 2018-07-25 DIAGNOSIS — R10.31: Primary | ICD-10-CM

## 2018-07-25 DIAGNOSIS — Z88.2: ICD-10-CM

## 2018-07-25 DIAGNOSIS — M48.062: ICD-10-CM

## 2018-07-25 DIAGNOSIS — E11.9: ICD-10-CM

## 2018-07-25 DIAGNOSIS — Z91.048: ICD-10-CM

## 2018-07-25 DIAGNOSIS — Z88.8: ICD-10-CM

## 2018-07-25 DIAGNOSIS — J45.909: ICD-10-CM

## 2018-07-25 DIAGNOSIS — Z79.84: ICD-10-CM

## 2018-07-25 LAB
ALBUMIN SERPL-MCNC: 3.9 GM/DL (ref 3.4–5)
ALP SERPL-CCNC: 96 U/L (ref 45–117)
ALT SERPL-CCNC: 39 U/L (ref 12–78)
AST SERPL-CCNC: 25 U/L (ref 15–37)
BASOPHILS # BLD: 0.01 K/UL (ref 0–0.2)
BASOPHILS NFR BLD: 0.1 %
BUN SERPL-MCNC: 21 MG/DL (ref 7–18)
CALCIUM SERPL-MCNC: 9.4 MG/DL (ref 8.5–10.1)
CO2 SERPL-SCNC: 29 MMOL/L (ref 21–32)
CREAT SERPL-MCNC: 0.94 MG/DL (ref 0.6–1.2)
EOS ABS #: 0.03 K/UL (ref 0–0.5)
EOSINOPHIL NFR BLD AUTO: 325 K/UL (ref 130–400)
GLUCOSE SERPL-MCNC: 127 MG/DL (ref 70–99)
HCT VFR BLD CALC: 44.3 % (ref 37–47)
HGB BLD-MCNC: 14.6 G/DL (ref 12–16)
IG#: 0.03 K/UL (ref 0–0.02)
IMM GRANULOCYTES NFR BLD AUTO: 10.5 %
LIPASE: 191 U/L (ref 73–393)
LYMPHOCYTES # BLD: 0.84 K/UL (ref 1.2–3.4)
MCH RBC QN AUTO: 31.4 PG (ref 25–34)
MCHC RBC AUTO-ENTMCNC: 33 G/DL (ref 32–36)
MCV RBC AUTO: 95.3 FL (ref 80–100)
MONO ABS #: 0.71 K/UL (ref 0.11–0.59)
MONOCYTES NFR BLD: 8.9 %
NEUT ABS #: 6.38 K/UL (ref 1.4–6.5)
NEUTROPHILS # BLD AUTO: 0.4 %
NEUTROPHILS NFR BLD AUTO: 79.7 %
PMV BLD AUTO: 10 FL (ref 7.4–10.4)
POTASSIUM SERPL-SCNC: 3.9 MMOL/L (ref 3.5–5.1)
PROT SERPL-MCNC: 8 GM/DL (ref 6.4–8.2)
RED CELL DISTRIBUTION WIDTH CV: 13.8 % (ref 11.5–14.5)
RED CELL DISTRIBUTION WIDTH SD: 47.9 FL (ref 36.4–46.3)
SODIUM SERPL-SCNC: 141 MMOL/L (ref 136–145)
WBC # BLD AUTO: 8 K/UL (ref 4.8–10.8)

## 2018-07-25 NOTE — DIAGNOSTIC IMAGING REPORT
ABDOMEN AND PELVIS CT WITH IV CONTRAST



CT DOSE: 455.13 mGy.cm



HISTORY: Acute right lower quadrant abdominal pain  RLQ pain 3 days, eval appy



TECHNIQUE: Multiaxial CT images of the abdomen and pelvis were performed

following the use of intravenous contrast.  A dose lowering technique was

utilized adhering to the principles of ALARA.



COMPARISON STUDY: CT abdomen and pelvis 1/12/2018.



FINDINGS: 

Partially imaged consolidation of the right middle lobe. Dependent subsegmental

bibasilar atelectasis. Study is mildly motion degraded. There is no pneumatosis

or pneumoperitoneum. Imaged inferior cardiac chambers appear mildly enlarged

with coronary arterial calcifications noted. The gallbladder, liver, and adrenal

glands are unremarkable. There is moderate generalized pancreatic atrophy.

Calcified granulomata noted about the spleen.



Cyst of the inferior pole left kidney measures 2.4 cm. No renal calculi or

obstructive uropathy. 2.0 cm cyst is noted within the anterior interpolar right

kidney. The bladder, uterus and adnexa are unremarkable. Surgical suture

material is noted about the bilateral adnexa. Moderate atherosclerosis of the

aorta. IVC appears to be within normal limits. There are no pathologically

enlarged lymph nodes identified.



There is no bowel obstruction. Colonic diverticulosis without diverticulitis.

Terminal ileum and appendix appear normal. Small fat filled periumbilical

hernia, diastases 2.1 cm. No ascites or mesenteric inflammatory changes. Soft

tissues and bones appear unremarkable. Prior laminectomy with posterior

interbody deya and screw fusion at L3-S1. Grade 1 anterolisthesis L3 on L4 and L4

on L5. Discectomy changes at L4-L5.



 IMPRESSION: 



1. No acute intra-abdominal or intrapelvic abnormality identified.

2. No bowel obstruction or focal bowel wall thickening. Normal appendix.

3. Colonic diverticulosis without diverticulitis.

4. Small fat filled periumbilical hernia.

5. Additional findings as above. 







Electronically signed by:  Tab Ma M.D.

7/25/2018 6:18 PM



Dictated Date/Time:  7/25/2018 5:55 PM

## 2018-07-25 NOTE — EMERGENCY ROOM VISIT NOTE
History


Report prepared by Fatemeh:  Vance Wild


Under the Supervision of:  Dr. Isai Chao


First contact with patient:  15:58


Chief Complaint:  ABDOMINAL PAIN


Stated Complaint:  ABDOMINAL PAIN RIGHT SIDE


Nursing Triage Summary:  


pt reports right abd pain  started monday was sick over weekend . feel saturday 


evening. denies any n/v





History of Present Illness


The patient is a 78 year old female who presents to the Emergency Room with 

complaints of worsening abdominal pain that began on Monday, 2 days ago. The 

patient states that the pain is localized to the right lower abdominal quadrant 

and radiates into the central abdomen. There is no radiation of the pain into 

her back. She has been taking Ibuprofen for her pain, but noting has been 

alleviating her symptoms. She rates the severity of her current pain as a 9/10 

in severity. She adds that she experienced a falling episode on Saturday, 4 

days ago after losing her balance. She fell onto her left side, but is not 

having any abdominal pain on that side. She has not experienced any associated 

nausea, vomiting, and diarrhea with her abdominal pain. She denies having any 

chest pain or pain/numbness in her lower extremities.





   Source of History:  patient


   Onset:  2 days ago


   Position:  abdomen (RLQ)


   Symptom Intensity:  9/10


   Timing:  worsening


   Modifying Factors (Relieving):  other (N/A)


   Associated Symptoms:  No chest pain, No nausea, No vomiting, No diarrhea, No 

weakness, No numbness





Review of Systems


See HPI for pertinent positives and negatives.  A total of ten systems were 

reviewed and were otherwise negative.





Past Medical & Surgical


Medical Problems:


(1) Asthma


(2) DM (diabetes mellitus)


(3) Lumbar stenosis with neurogenic claudication


(4) Pneumonia


(5) Replacement of total knee joint








Family History





Patient reports no known family medical history.





Social History


Smoking Status:  Never Smoker


Alcohol Use:  none


Drug Use:  none


Marital Status:  


Housing Status:  lives with family





Current/Historical Medications


Scheduled


Albuterol Sulfate (Albuterol Sulfate Er), 1 TAB PO TID


B-Complex Vitamins (Vitamin B Complex), 1 TAB PO QAM


Beclomethasone Dip (Qvar), 1 PUFF INH BID


Biotin (Biotin), 1 CAP PO BID


Cholecalciferol (Vitamin D), 1 TAB PO QAM


Cyanocobalamin (Vitamin B12 500MCG), 500 MCG PO QAM


Escitalopram (Lexapro), 1.5 TAB PO QPM


Ipratropium Bromide (Atrovent Hfa), 1 PUFFS INH BID


Metformin Hcl (Glucophage), 500 MG PO QAM


Mirtazapine Soltab (Remeron Soltab), 15 MG PO QPM


Montelukast Sodium (Singulair), 10 MG PO HS


Multiple Vitamins W/ Minerals (Preservision Areds 2), 1 CAP PO BID


Multivitamin (Multivitamin), 1 TAB PO QAM


Nabumetone (Relafen), 500 MG PO BID


Omeprazole (Prilosec), 40 MG PO QPM


Pravastatin (Pravachol ), 20 MG PO QPM


Pyridoxine Hcl (Vitamin B6 100 Mg), 100 MG PO QAM


Ropinirole (Requip), 2 TAB PO QPM


Salmeterol Xinafoate (Serevent Diskus), 1 DOSE INH BID


Sennosides-Docusate Sodium (Stool Softener), 1 TAB PO HS


Vitamin E (E-400), 400 UNIT PO DAILY


[Potassium W/Magn], 3 TAB PO BID


[Vitamin C], 1,500 MG PO QAM





Scheduled PRN


Clonazepam (Klonopin), 0.5 MG PO HS PRN for Anxiety


Ibuprofen (Ibuprofen), 1 TAB PO Q8 PRN for Pain


Nystatin/Triamcinolone (Mycogen || ), 1 DOSE EXT BID PRN for PRN





Allergies


Coded Allergies:  


     Alcohol (Verified  Allergy, Unknown, alcoholic anonymous, 2/12/18)


     Methylprednisolone (Verified  Allergy, Unknown, UNSURE?, 2/12/18)


     Levofloxacin (Unverified  Adverse Reaction, Intermediate, Insomnia and 

Anxiety, 2/12/18)


 Per Pulmonary records


     Moxifloxacin (Unverified  Adverse Reaction, Intermediate, Insomnia and 

"shaky", 2/12/18)


 Per Pulmonology records


     Sulfa Antibiotics (Verified  Adverse Reaction, Intermediate, severe 

vomiting, 2/12/18)


     Penicillins (Verified  Adverse Reaction, Unknown, HEART PALPIATIONS, 2/12/ 18)





Physical Exam


Vital Signs











  Date Time  Temp Pulse Resp B/P (MAP) Pulse Ox O2 Delivery O2 Flow Rate FiO2


 


7/25/18 18:56  83 18 165/82 96   


 


7/25/18 17:52  89 20 143/93 94 Room Air  


 


7/25/18 17:00  73      


 


7/25/18 15:44 36.4 102 20 134/70 93 Room Air  











Physical Exam


GENERAL: Awake, alert, well-appearing, in no distress


HENT: Normocephalic, atraumatic. Oropharynx unremarkable.


EYES: Normal conjunctiva. Sclera non-icteric.


NECK: Supple. No nuchal rigidity. 


RESPIRATORY: Clear to auscultation. No wheezes. Normal respiratory effort.


CARDIAC: Normal rate.  Normal rhythm. Extremities warm and well perfused.


GI: Soft, non-distended. Some mild to moderate right lower quadrant tenderness 

to palpation. No rebound. No masses.  Not peritoneal.


RECTAL: Deferred.


MUSCULOSKELETAL: Atraumatic. Chest examination reveals no tenderness. There is 

no CVA tenderness to palpation. 


LOWER EXTREMITIES: Calves are equal size bilaterally and non-tender. No edema


NEURO: Normal sensorium. No sensory or motor deficits noted. 


SKIN: Warm and dry. No rash or jaundice noted.





Medical Decision & Procedures


ER Provider


Diagnostic Interpretation:


Radiology results as stated below per my review and radiologist interpretation: 





ABDOMEN AND PELVIS CT WITH IV CONTRAST





CT DOSE: 455.13 mGy.cm





HISTORY: Acute right lower quadrant abdominal pain  RLQ pain 3 days, eval appy





TECHNIQUE: Multiaxial CT images of the abdomen and pelvis were performed


following the use of intravenous contrast.  A dose lowering technique was


utilized adhering to the principles of ALARA.





COMPARISON STUDY: CT abdomen and pelvis 1/12/2018.





FINDINGS: 


Partially imaged consolidation of the right middle lobe. Dependent subsegmental


bibasilar atelectasis. Study is mildly motion degraded. There is no pneumatosis


or pneumoperitoneum. Imaged inferior cardiac chambers appear mildly enlarged


with coronary arterial calcifications noted. The gallbladder, liver, and adrenal


glands are unremarkable. There is moderate generalized pancreatic atrophy.


Calcified granulomata noted about the spleen.





Cyst of the inferior pole left kidney measures 2.4 cm. No renal calculi or


obstructive uropathy. 2.0 cm cyst is noted within the anterior interpolar right


kidney. The bladder, uterus and adnexa are unremarkable. Surgical suture


material is noted about the bilateral adnexa. Moderate atherosclerosis of the


aorta. IVC appears to be within normal limits. There are no pathologically


enlarged lymph nodes identified.





There is no bowel obstruction. Colonic diverticulosis without diverticulitis.


Terminal ileum and appendix appear normal. Small fat filled periumbilical


hernia, diastases 2.1 cm. No ascites or mesenteric inflammatory changes. Soft


tissues and bones appear unremarkable. Prior laminectomy with posterior


interbody deya and screw fusion at L3-S1. Grade 1 anterolisthesis L3 on L4 and L4


on L5. Discectomy changes at L4-L5.





 IMPRESSION: 





1. No acute intra-abdominal or intrapelvic abnormality identified.


2. No bowel obstruction or focal bowel wall thickening. Normal appendix.


3. Colonic diverticulosis without diverticulitis.


4. Small fat filled periumbilical hernia.


5. Additional findings as above. 











Electronically signed by:  Tab Ma M.D.


7/25/2018 6:18 PM





Dictated Date/Time:  7/25/2018 5:55 PM





Laboratory Results


7/25/18 16:15








Red Blood Count 4.65, Mean Corpuscular Volume 95.3, Mean Corpuscular Hemoglobin 

31.4, Mean Corpuscular Hemoglobin Concent 33.0, Mean Platelet Volume 10.0, 

Neutrophils (%) (Auto) 79.7, Lymphocytes (%) (Auto) 10.5, Monocytes (%) (Auto) 

8.9, Eosinophils (%) (Auto) 0.4, Basophils (%) (Auto) 0.1, Neutrophils # (Auto) 

6.38, Lymphocytes # (Auto) 0.84, Monocytes # (Auto) 0.71, Eosinophils # (Auto) 

0.03, Basophils # (Auto) 0.01





7/25/18 16:15

















Test


  7/25/18


16:15


 


White Blood Count


  8.00 K/uL


(4.8-10.8)


 


Red Blood Count


  4.65 M/uL


(4.2-5.4)


 


Hemoglobin


  14.6 g/dL


(12.0-16.0)


 


Hematocrit 44.3 % (37-47) 


 


Mean Corpuscular Volume


  95.3 fL


()


 


Mean Corpuscular Hemoglobin


  31.4 pg


(25-34)


 


Mean Corpuscular Hemoglobin


Concent 33.0 g/dl


(32-36)


 


Platelet Count


  325 K/uL


(130-400)


 


Mean Platelet Volume


  10.0 fL


(7.4-10.4)


 


Neutrophils (%) (Auto) 79.7 % 


 


Lymphocytes (%) (Auto) 10.5 % 


 


Monocytes (%) (Auto) 8.9 % 


 


Eosinophils (%) (Auto) 0.4 % 


 


Basophils (%) (Auto) 0.1 % 


 


Neutrophils # (Auto)


  6.38 K/uL


(1.4-6.5)


 


Lymphocytes # (Auto)


  0.84 K/uL


(1.2-3.4)


 


Monocytes # (Auto)


  0.71 K/uL


(0.11-0.59)


 


Eosinophils # (Auto)


  0.03 K/uL


(0-0.5)


 


Basophils # (Auto)


  0.01 K/uL


(0-0.2)


 


RDW Standard Deviation


  47.9 fL


(36.4-46.3)


 


RDW Coefficient of Variation


  13.8 %


(11.5-14.5)


 


Immature Granulocyte % (Auto) 0.4 % 


 


Immature Granulocyte # (Auto)


  0.03 K/uL


(0.00-0.02)


 


Anion Gap


  5.0 mmol/L


(3-11)


 


Est Creatinine Clear Calc


Drug Dose 52.8 ml/min 


 


 


Estimated GFR (


American) 67.8 


 


 


Estimated GFR (Non-


American 58.5 


 


 


BUN/Creatinine Ratio 21.9 (10-20) 


 


Calcium Level


  9.4 mg/dl


(8.5-10.1)


 


Total Bilirubin


  0.3 mg/dl


(0.2-1)


 


Direct Bilirubin


  < 0.1 mg/dl


(0-0.2)


 


Aspartate Amino Transf


(AST/SGOT) 25 U/L (15-37) 


 


 


Alanine Aminotransferase


(ALT/SGPT) 39 U/L (12-78) 


 


 


Alkaline Phosphatase


  96 U/L


()


 


Total Protein


  8.0 gm/dl


(6.4-8.2)


 


Albumin


  3.9 gm/dl


(3.4-5.0)


 


Lipase


  191 U/L


()





Laboratory results reviewed by me





Medications Administered











 Medications


  (Trade)  Dose


 Ordered  Sig/Garrett


 Route  Start Time


 Stop Time Status Last Admin


Dose Admin


 


 Sodium Chloride  500 ml @ 


 999 mls/hr  Q31M STAT


 IV  7/25/18 16:06


 7/25/18 16:37 DC 7/25/18 16:41


999 MLS/HR


 


 Ketorolac


 Tromethamine


  (Toradol Inj)  15 mg  NOW  STAT


 IV  7/25/18 16:06


 7/25/18 16:08 DC 7/25/18 16:39


15 MG











ED Course


1559: The patient was evaluated in room B5. A complete history and physical 

exam was performed.





1606: Ordered Toradol 15 mg IV, Sodium Chloride 500 mL @ 999 mL/hr IV. 





1835: I reevaluated the patient. I updated the patient on laboratory and 

imaging findnis. I discussed results and discharge instructions: She verbalized 

understanding and agreement. The patient is ready for discharge. Will follow-up 

with PCP tomorrow.





Medical Decision


Differential diagnosis:


Etiologies such as appendicitis, diverticulitis, PUD, biliary pathology, UTI, 

pancreatitis, obstruction, mesenteric ischemia, aortic pathology, infections, 

inflammatory bowel disease, renal colic, as well as others were entertained.





Patient presents with several days of right lower quadrant pain in the abdomen.

  No back pain or sciatic pain complaints.  No nausea vomiting or diarrhea.  

States he had a mechanical fall several days before this began.  No overlying 

skin changes, bruising, or rash are appreciated.  Right lower quadrant 

tenderness.  Do not believe is gynecological in origin.  Does not seem 

consistent with nephrolithiasis.  Lower suspicion for biliary pathology or 

pancreatitis. Do not believe this represents cardiac disease.  Could possibly 

be appendicitis or colitis.  She denies history of constipation, diarrhea, 

melena, or hematochezia.  Does not seem consistent with urinary pathology.  Well

-appearing.  CT scan and labs were completed.  Review of records indicate a 

colonoscopy in February with some findings of diverticula only and nonbleeding 

internal hemorrhoids. Labs are grossly unremarkable and CT scan is 

unremarkable.  Again reviewing prior records it seems that she has had issues 

with this RLQ pain in the past.  Given her well appearance of the findings 

today feel she is stable for discharge and outpatient follow-up.  Recommend 

continued use of over-the-counter Tylenol and/or ibuprofen.  Discussed 

abstaining from metformin for 2 days given IV contrast load.  She acknowledged 

this and return criteria along with plan for outpatient follow-up tomorrow.





Medication Reconcilliation


Current Medication List:  was personally reviewed by me





Blood Pressure Screening


Patient's blood pressure:  Elevated blood pressure


Blood pressure disposition:  Referred to PCP





Impression





 Primary Impression:  


 Abdominal pain





Scribe Attestation


The scribe's documentation has been prepared under my direction and personally 

reviewed by me in its entirety. I confirm that the note above accurately 

reflects all work, treatment, procedures, and medical decision making performed 

by me.





Departure Information


Dispostion


Home / Self-Care





Prescriptions





Ibuprofen (Ibuprofen) 600 Mg Tab


1 TAB PO Q8 Y for Pain for 3 Days, #9 TAB 0 Refills


   Prov: Isai Chao M.D.         7/25/18





Referrals


Chintan Arango M.D. (PCP)





Patient Instructions


My [facility]





Additional Instructions





Please continue to maintain good hydration.  Given the testing done today with 

the IV dye used please do not take your metformin for the next 48 hours.  You 

may restart your metformin after this. You may use over the counter pain 

medicine as needed as well. Please continue to monitor for new or worrisome 

symptoms and return for reevaluation if you have concern.  Recommend outpatient 

follow-up with regular doctor tomorrow as scheduled.





Problem Qualifiers








 Primary Impression:  


 Abdominal pain


 Abdominal location:  right lower quadrant  Qualified Codes:  R10.31 - Right 

lower quadrant pain